# Patient Record
Sex: FEMALE | Race: WHITE
[De-identification: names, ages, dates, MRNs, and addresses within clinical notes are randomized per-mention and may not be internally consistent; named-entity substitution may affect disease eponyms.]

---

## 2017-07-28 ENCOUNTER — HOSPITAL ENCOUNTER (OUTPATIENT)
Dept: HOSPITAL 17 - HSDC | Age: 57
Discharge: HOME | End: 2017-07-28
Attending: INTERNAL MEDICINE
Payer: COMMERCIAL

## 2017-07-28 VITALS
OXYGEN SATURATION: 100 % | HEART RATE: 76 BPM | RESPIRATION RATE: 20 BRPM | DIASTOLIC BLOOD PRESSURE: 62 MMHG | SYSTOLIC BLOOD PRESSURE: 146 MMHG | TEMPERATURE: 98.1 F

## 2017-07-28 VITALS — WEIGHT: 93.48 LBS | BODY MASS INDEX: 19.62 KG/M2 | HEIGHT: 58 IN

## 2017-07-28 VITALS
HEART RATE: 64 BPM | SYSTOLIC BLOOD PRESSURE: 126 MMHG | RESPIRATION RATE: 18 BRPM | TEMPERATURE: 98.3 F | OXYGEN SATURATION: 100 % | DIASTOLIC BLOOD PRESSURE: 66 MMHG

## 2017-07-28 DIAGNOSIS — J44.9: ICD-10-CM

## 2017-07-28 DIAGNOSIS — E78.00: ICD-10-CM

## 2017-07-28 DIAGNOSIS — I10: ICD-10-CM

## 2017-07-28 DIAGNOSIS — G25.81: ICD-10-CM

## 2017-07-28 DIAGNOSIS — J18.9: ICD-10-CM

## 2017-07-28 DIAGNOSIS — R04.2: Primary | ICD-10-CM

## 2017-07-28 DIAGNOSIS — Z87.891: ICD-10-CM

## 2017-07-28 DIAGNOSIS — K21.9: ICD-10-CM

## 2017-07-28 DIAGNOSIS — G47.9: ICD-10-CM

## 2017-07-28 DIAGNOSIS — J40: ICD-10-CM

## 2017-07-28 DIAGNOSIS — J30.9: ICD-10-CM

## 2017-07-28 LAB
APTT BLD: 27.1 SEC (ref 24.3–30.1)
BASOPHILS # BLD AUTO: 0 TH/MM3 (ref 0–0.2)
BASOPHILS NFR BLD: 0.7 % (ref 0–2)
EOSINOPHIL # BLD: 0.2 TH/MM3 (ref 0–0.4)
EOSINOPHIL NFR BLD: 3.1 % (ref 0–4)
ERYTHROCYTE [DISTWIDTH] IN BLOOD BY AUTOMATED COUNT: 13.6 % (ref 11.6–17.2)
HCT VFR BLD CALC: 34.8 % (ref 35–46)
HEMO FLAGS: (no result)
INR PPP: 0.9 RATIO
LYMPHOCYTES # BLD AUTO: 1.9 TH/MM3 (ref 1–4.8)
LYMPHOCYTES NFR BLD AUTO: 28.2 % (ref 9–44)
MCH RBC QN AUTO: 29 PG (ref 27–34)
MCHC RBC AUTO-ENTMCNC: 33.4 % (ref 32–36)
MCV RBC AUTO: 87.1 FL (ref 80–100)
MONOCYTES NFR BLD: 6.1 % (ref 0–8)
NEUTROPHILS # BLD AUTO: 4.2 TH/MM3 (ref 1.8–7.7)
NEUTROPHILS NFR BLD AUTO: 61.9 % (ref 16–70)
PLATELET # BLD: 178 TH/MM3 (ref 150–450)
PROTHROMBIN TIME: 10.3 SEC (ref 9.8–11.6)
RBC # BLD AUTO: 3.99 MIL/MM3 (ref 4–5.3)
WBC # BLD AUTO: 6.8 TH/MM3 (ref 4–11)

## 2017-07-28 PROCEDURE — 00520 ANES CLOSED CHEST PX NOS: CPT

## 2017-07-28 PROCEDURE — 87205 SMEAR GRAM STAIN: CPT

## 2017-07-28 PROCEDURE — 85025 COMPLETE CBC W/AUTO DIFF WBC: CPT

## 2017-07-28 PROCEDURE — 87116 MYCOBACTERIA CULTURE: CPT

## 2017-07-28 PROCEDURE — 31623 DX BRONCHOSCOPE/BRUSH: CPT

## 2017-07-28 PROCEDURE — 87070 CULTURE OTHR SPECIMN AEROBIC: CPT

## 2017-07-28 PROCEDURE — 87102 FUNGUS ISOLATION CULTURE: CPT

## 2017-07-28 PROCEDURE — 87206 SMEAR FLUORESCENT/ACID STAI: CPT

## 2017-07-28 PROCEDURE — 85610 PROTHROMBIN TIME: CPT

## 2017-07-28 PROCEDURE — 85730 THROMBOPLASTIN TIME PARTIAL: CPT

## 2017-07-28 PROCEDURE — 87015 SPECIMEN INFECT AGNT CONCNTJ: CPT

## 2017-08-08 NOTE — MR
cc:

JOSE M SALGUERO M.D.

****

 

 

DATE

8/8/17

 

PROCEDURE

Fiberoptic bronchoscopy flexible

 

REASON FOR BRONCHOSCOPY

Bilateral lung nodules, rule out underlying malignancy, rule out  chronic

inflammatory process.

 

Fiberoptic bronchoscopy performed via LMA. Anesthesia general.

Vocal cords visualized, appeared intact.  Trachea mildly hyperemic.  Bhavani

sharp.  Right main stem bronchus, right upper, middle and lower lobes, left

main bronchus, left upper and lower lobes inspected.  No obstructive pathology

or mass lesion seen.  Diffuse mild to moderate hyperemia throughout noticed.

Washings obtained from both sides of the tracheobronchial tree for routine TB,

fungal cultures as well as cytological exam.  Cytological brush biopsies right

lower lobe obtained for cytological exam. Procedure well tolerated.  The

patient transferred to recovery room in stable condition.

 

IMPRESSION

1.  Mild to moderate tracheobronchitis

2.  No obstruction or mass lesion.

3.  Samples obtained as above

4.  Procedure well tolerated

5.  Patient transferred to recovery in stable condition.

 

 

                              _________________________________

                              Jose M Salguero MD

 

 

 

WWW/

D:  8/8/2017/1:45 PM

T:  8/8/2017/6:51 PM

Visit #:  [id number]

Job #:  [id number]

## 2017-11-10 ENCOUNTER — HOSPITAL ENCOUNTER (EMERGENCY)
Dept: HOSPITAL 17 - NEPE | Age: 57
Discharge: HOME | End: 2017-11-10
Payer: COMMERCIAL

## 2017-11-10 VITALS
HEART RATE: 77 BPM | RESPIRATION RATE: 13 BRPM | DIASTOLIC BLOOD PRESSURE: 93 MMHG | SYSTOLIC BLOOD PRESSURE: 142 MMHG | TEMPERATURE: 98 F | OXYGEN SATURATION: 100 %

## 2017-11-10 VITALS — BODY MASS INDEX: 18.05 KG/M2 | HEIGHT: 58 IN | WEIGHT: 85.98 LBS

## 2017-11-10 VITALS — DIASTOLIC BLOOD PRESSURE: 65 MMHG | SYSTOLIC BLOOD PRESSURE: 141 MMHG

## 2017-11-10 DIAGNOSIS — Z85.3: ICD-10-CM

## 2017-11-10 DIAGNOSIS — Z86.73: ICD-10-CM

## 2017-11-10 DIAGNOSIS — R10.2: Primary | ICD-10-CM

## 2017-11-10 DIAGNOSIS — Z72.0: ICD-10-CM

## 2017-11-10 DIAGNOSIS — Z95.0: ICD-10-CM

## 2017-11-10 DIAGNOSIS — M06.9: ICD-10-CM

## 2017-11-10 DIAGNOSIS — I10: ICD-10-CM

## 2017-11-10 LAB
ALP SERPL-CCNC: 113 U/L (ref 45–117)
ALT SERPL-CCNC: 22 U/L (ref 10–53)
ANION GAP SERPL CALC-SCNC: 5 MEQ/L (ref 5–15)
AST SERPL-CCNC: 17 U/L (ref 15–37)
BASOPHILS # BLD AUTO: 0.1 TH/MM3 (ref 0–0.2)
BASOPHILS NFR BLD: 1 % (ref 0–2)
BILIRUB SERPL-MCNC: 0.4 MG/DL (ref 0.2–1)
BUN SERPL-MCNC: 7 MG/DL (ref 7–18)
CHLORIDE SERPL-SCNC: 103 MEQ/L (ref 98–107)
COLOR UR: YELLOW
COMMENT (UR): (no result)
CULTURE IF INDICATED: (no result)
EOSINOPHIL # BLD: 0.2 TH/MM3 (ref 0–0.4)
EOSINOPHIL NFR BLD: 3.1 % (ref 0–4)
ERYTHROCYTE [DISTWIDTH] IN BLOOD BY AUTOMATED COUNT: 14 % (ref 11.6–17.2)
GFR SERPLBLD BASED ON 1.73 SQ M-ARVRAT: 72 ML/MIN (ref 89–?)
GLUCOSE UR STRIP-MCNC: (no result) MG/DL
HCO3 BLD-SCNC: 31.2 MEQ/L (ref 21–32)
HCT VFR BLD CALC: 41.4 % (ref 35–46)
HEMO FLAGS: (no result)
HGB UR QL STRIP: (no result)
KETONES UR STRIP-MCNC: (no result) MG/DL
LYMPHOCYTES # BLD AUTO: 2.3 TH/MM3 (ref 1–4.8)
LYMPHOCYTES NFR BLD AUTO: 36.7 % (ref 9–44)
MCH RBC QN AUTO: 29.7 PG (ref 27–34)
MCHC RBC AUTO-ENTMCNC: 34.5 % (ref 32–36)
MCV RBC AUTO: 86 FL (ref 80–100)
MONOCYTES NFR BLD: 4.7 % (ref 0–8)
NEUTROPHILS # BLD AUTO: 3.4 TH/MM3 (ref 1.8–7.7)
NEUTROPHILS NFR BLD AUTO: 54.5 % (ref 16–70)
NITRITE UR QL STRIP: (no result)
PLATELET # BLD: 212 TH/MM3 (ref 150–450)
POTASSIUM SERPL-SCNC: 4.4 MEQ/L (ref 3.5–5.1)
RBC # BLD AUTO: 4.81 MIL/MM3 (ref 4–5.3)
SODIUM SERPL-SCNC: 139 MEQ/L (ref 136–145)
SP GR UR STRIP: 1.01 (ref 1–1.03)
WBC # BLD AUTO: 6.2 TH/MM3 (ref 4–11)

## 2017-11-10 PROCEDURE — 96372 THER/PROPH/DIAG INJ SC/IM: CPT

## 2017-11-10 PROCEDURE — 74177 CT ABD & PELVIS W/CONTRAST: CPT

## 2017-11-10 PROCEDURE — 99285 EMERGENCY DEPT VISIT HI MDM: CPT

## 2017-11-10 PROCEDURE — 96361 HYDRATE IV INFUSION ADD-ON: CPT

## 2017-11-10 PROCEDURE — 83690 ASSAY OF LIPASE: CPT

## 2017-11-10 PROCEDURE — 96374 THER/PROPH/DIAG INJ IV PUSH: CPT

## 2017-11-10 PROCEDURE — 81001 URINALYSIS AUTO W/SCOPE: CPT

## 2017-11-10 PROCEDURE — 83605 ASSAY OF LACTIC ACID: CPT

## 2017-11-10 PROCEDURE — 85025 COMPLETE CBC W/AUTO DIFF WBC: CPT

## 2017-11-10 PROCEDURE — 80053 COMPREHEN METABOLIC PANEL: CPT

## 2017-11-10 NOTE — RADRPT
EXAM DATE/TIME:  11/10/2017 10:02 

 

[facility] COMPARISON:     

No previous studies available for comparison.

 

 

INDICATIONS :     

Pelvic pain for a few weeks. 

                      

 

IV CONTRAST:     

80 cc Omnipaque 350 (iohexol) IV 

 

 

ORAL CONTRAST:      

No oral contrast ingested.

                      

 

RADIATION DOSE:     

4.98 CTDIvol (mGy) 

 

 

MEDICAL HISTORY :     

Carcinoma, breast. Hypertension. 

 

SURGICAL HISTORY :      

Cholecystectomy. Mastectomy, right.

 

ENCOUNTER:      

Initial

 

ACUITY:      

3 weeks

 

PAIN SCALE:      

5/10

 

LOCATION:       

Bilateral pelvis 

 

TECHNIQUE:     

Volumetric scanning of the abdomen and pelvis was performed.  Using automated exposure control and ad
justment of the mA and/or kV according to patient size, radiation dose was kept as low as reasonably 
achievable to obtain optimal diagnostic quality images.  DICOM format image data is available electro
nically for review and comparison.  

 

FINDINGS:     

The lung base is are clear.

The liver, spleen, pancreas and adrenals are unremarkable.  Surgical clips are seen at the GE junctio
n.

 

There is symmetrical renal function.  There is no ascites or adenopathy.

Stool is seen throughout the colon

There is no evidence for mass in the pelvis.  The uterine cervical junction is prominent..  Direct vi
sualization is suggested.

Abdominal wall is intact

Review of bone windows reveals only degenerative changes.

 

CONCLUSION:     Negative for pelvic mass.  

 

 

 Romario Tompkins MD FACR on November 10, 2017 at 10:26           

Board Certified Radiologist.

 This report was verified electronically.

## 2017-11-10 NOTE — PD
HPI


Chief Complaint:  Abdominal Pain


Time Seen by Provider:  08:32


Travel History


International Travel<30 days:  No


Contact w/Intl Traveler<30days:  No


Traveled to known affect area:  No





History of Present Illness


HPI


57-year-old female arrives due to pelvic pain.  She reports decreased 

urination.  Duration is only several weeks.  She was evidently diagnosed with a 

potential mass based on CT imaging about 8 months ago.  Subsequent 

sigmoidoscopy revealed no mass however colonoscopy was recommended however none 

was performed.  She follows with family medicine resident service and has been 

taking Percocet 7.5 mg tablets as needed for pain.  She's had no fever.  Nausea 

is reported however no vomiting.





PFSH


Past Medical History


Arthritis:  Yes (RA)


Heart Rhythm Problems:  No


Cancer:  Yes (BREAST)


Cardiac Catheterization:  Yes


Cardiovascular Problems:  Yes (PACEMAKER)


High Cholesterol:  No


Chest Pain:  Yes


Congestive Heart Failure:  No


Cerebrovascular Accident:  Yes (TIA)


Diabetes:  No


Diminished Hearing:  No


Hypertension:  Yes


Implanted Vascular Access Dvce:  Yes ([employer], MODEL [id number])





Past Surgical History


Cardiac Surgery:  Yes (PACEMAKER)


Cholecystectomy:  Yes


Coronary Artery Bypass Graft:  No


Mastectomy:  Yes (RIGHT)





Family History


Family Myocardial Infarction:  Yes (father, brother)





Social History


Alcohol Use:  No


Tobacco Use:  Yes


Substance Use:  No





Allergies-Medications


(Allergen,Severity, Reaction):  


Coded Allergies:  


     pregabalin (Unverified  Allergy, Severe, Rash, 11/10/17)


     tramadol (Unverified  Allergy, Severe, Rash, 11/10/17)


Reported Meds & Prescriptions





Reported Meds & Active Scripts


Active


Reported


Oxycodone-Acetaminophen 7.5-325 mg Tab 1 Tab PO Q6H PRN


Proair Hfa 8.5 GM Inh (Albuterol Sulfate) 90 Mcg/Act Aer 2 Puff INH Q4HR PRN


     108 mcg/actuation


Pramipexole (Pramipexole Dihydrochloride) 0.125 Mg Tab 0.125 Mg PO DAILY


Omeprazole 40 Mg Cap 40 Mg PO DAILY


Montelukast (Montelukast Sodium) 10 Mg Tab 10 Mg PO HS


Lisinopril 20 Mg Tab 20 Mg PO DAILY


Flexeril (Cyclobenzaprine HCl) 5 Mg Tab 5 Mg PO TID


Amlodipine (Amlodipine Besylate) 5 Mg Tab 5 Mg PO BID


Aspirin 81 Low Dose (Aspirin) 81 Mg Chew 81 Mg CHEW DAILY


Ventolin Hfa 18 GM Inh (Albuterol Sulfate) 90 Mcg/Act Aer 2 Puff INH Q4H PRN


Symbicort Inh (Budesonide/Formoterol Fumarate) 160-4.5 Mcg/Act Aero 2 Puff INH 

Q12HR


Spiriva Handihaler (Tiotropium Inh) 18 Mcg Cap 18 Mcg INH DAILY


     1 capsule = 18 mcg


Hydroxyzine HCl 25 Mg Tab 25 Mg PO TID


Trazodone (Trazodone HCl) 50 Mg Tab 50 Mg PO HS


Nortriptyline (Nortriptyline HCl) 25 Mg Cap 25 Mg PO HS


Gabapentin 300 Mg Cap 600 Mg PO TID








Review of Systems


Except as stated in HPI:  all other systems reviewed are Neg


General / Constitutional:  No: Fever


Genitourinary:  Positive: Pelvic Pain





Physical Exam


Narrative


GENERAL: 57-year-old female pleasant well-nourished well-developed mild 

distress secondary to pain


SKIN: Focused skin assessment warm/dry.


HEAD: Atraumatic. Normocephalic. 


EYES: Pupils equal and round. No scleral icterus. No injection or drainage. 


ENT: No nasal bleeding or discharge.  Mucous membranes pink and moist.


NECK: Trachea midline. No JVD. 


CARDIOVASCULAR: Regular rate and rhythm.  No murmur appreciated.


RESPIRATORY: No accessory muscle use. Clear to auscultation. Breath sounds 

equal bilaterally. 


GASTROINTESTINAL: Soft.  Tenderness palpation supra pubic abdomen.


MUSCULOSKELETAL: No obvious deformities. No clubbing.  No cyanosis.  No edema. 


NEUROLOGICAL: Awake and alert. No obvious cranial nerve deficits.  Motor 

grossly within normal limits. Normal speech.


PSYCHIATRIC: Appropriate mood and affect; insight and judgment normal.





Data


Data


Last Documented VS





Vital Signs








  Date Time  Temp Pulse Resp B/P (MAP) Pulse Ox O2 Delivery O2 Flow Rate FiO2


 


11/10/17 11:13  73 15 141/65 (90) 97   


 


11/10/17 08:14 98.0       








Orders





 Orders


Complete Blood Count With Diff (11/10/17 08:36)


Comprehensive Metabolic Panel (11/10/17 08:36)


Lipase (11/10/17 08:36)


Lactic Acid (11/10/17 08:36)


Urinalysis - C+S If Indicated (11/10/17 08:36)


Ct Abd/Pel W Iv Contrast(Rout) (11/10/17 08:36)


Iv Access Insert/Monitor (11/10/17 08:36)


Ecg Monitoring (11/10/17 08:36)


Oximetry (11/10/17 08:36)


Sodium Chlor 0.9% 1000 Ml Inj (Ns 1000 M (11/10/17 08:36)


Sodium Chloride 0.9% Flush (Ns Flush) (11/10/17 08:45)


Oxycodone-Acetamin 5-325 Mg (Percocet (11/10/17 08:45)


Iohexol 350 Inj (Omnipaque 350 Inj) (11/10/17 10:05)


Dicyclomine Inj (Bentyl Inj) (11/10/17 10:30)


Ketorolac Inj (Toradol Inj) (11/10/17 10:30)


Al-Mag Hy-Si 40-40-4 Mg/Ml Liq (Mag-Al P (11/10/17 10:30)


Lidocaine 2% Viscous (Xylocaine 2% Visco (11/10/17 10:30)


Ed Discharge Order (11/10/17 10:44)





Labs





Laboratory Tests








Test


  11/10/17


08:44 11/10/17


08:47


 


White Blood Count 6.2 TH/MM3  


 


Red Blood Count 4.81 MIL/MM3  


 


Hemoglobin 14.3 GM/DL  


 


Hematocrit 41.4 %  


 


Mean Corpuscular Volume 86.0 FL  


 


Mean Corpuscular Hemoglobin 29.7 PG  


 


Mean Corpuscular Hemoglobin


Concent 34.5 % 


  


 


 


Red Cell Distribution Width 14.0 %  


 


Platelet Count 212 TH/MM3  


 


Mean Platelet Volume 9.9 FL  


 


Neutrophils (%) (Auto) 54.5 %  


 


Lymphocytes (%) (Auto) 36.7 %  


 


Monocytes (%) (Auto) 4.7 %  


 


Eosinophils (%) (Auto) 3.1 %  


 


Basophils (%) (Auto) 1.0 %  


 


Neutrophils # (Auto) 3.4 TH/MM3  


 


Lymphocytes # (Auto) 2.3 TH/MM3  


 


Monocytes # (Auto) 0.3 TH/MM3  


 


Eosinophils # (Auto) 0.2 TH/MM3  


 


Basophils # (Auto) 0.1 TH/MM3  


 


CBC Comment DIFF FINAL  


 


Differential Comment   


 


Urine Color YELLOW  


 


Urine Turbidity CLEAR  


 


Urine pH 7.0  


 


Urine Specific Gravity 1.015  


 


Urine Protein NEG mg/dL  


 


Urine Glucose (UA) NEG mg/dL  


 


Urine Ketones NEG mg/dL  


 


Urine Occult Blood NEG  


 


Urine Nitrite NEG  


 


Urine Bilirubin NEG  


 


Urine Urobilinogen 2.0 MG/DL  


 


Urine Leukocyte Esterase NEG  


 


Urine RBC


  LESS THAN 1


/hpf 


 


 


Urine WBC


  LESS THAN 1


/hpf 


 


 


Microscopic Urinalysis Comment


  CULT NOT


INDICATED 


 


 


Blood Urea Nitrogen 7 MG/DL  


 


Creatinine 0.81 MG/DL  


 


Random Glucose 134 MG/DL  


 


Total Protein 7.9 GM/DL  


 


Albumin 3.8 GM/DL  


 


Calcium Level 9.1 MG/DL  


 


Alkaline Phosphatase 113 U/L  


 


Aspartate Amino Transf


(AST/SGOT) 17 U/L 


  


 


 


Alanine Aminotransferase


(ALT/SGPT) 22 U/L 


  


 


 


Total Bilirubin 0.4 MG/DL  


 


Sodium Level 139 MEQ/L  


 


Potassium Level 4.4 MEQ/L  


 


Chloride Level 103 MEQ/L  


 


Carbon Dioxide Level 31.2 MEQ/L  


 


Anion Gap 5 MEQ/L  


 


Estimat Glomerular Filtration


Rate 72 ML/MIN 


  


 


 


Lipase 58 U/L  


 


Lactic Acid Level  1.2 mmol/L 











MDM


Medical Decision Making


Medical Screen Exam Complete:  Yes


Emergency Medical Condition:  Yes


Medical Record Reviewed:  Yes


Differential Diagnosis


Gastritis, pancreatitis, appendicitis, acute cholecystitis, ascending 

cholangitis, AAA, perforated viscous, mesenteric ischemia, hepatitis, cystitis, 

hydronephrosis/hydroureter/nephroureter calculus, mesenteric adenitis, biliary 

colic


Narrative Course


CBC & BMP Diagram


11/10/17 08:44








Total Protein 7.9, Albumin 3.8, Calcium Level 9.1, Alkaline Phosphatase 113, 

Aspartate Amino Transf (AST/SGOT) 17, Alanine Aminotransferase (ALT/SGPT) 22, 

Total Bilirubin 0.4





Lactic acid 1.2


Lipase 58


Urinalysis no UTI





Last Impressions








Abdomen/Pelvis CT 11/10/17 0836 Signed





Impressions: 





 Service Date/Time:  Friday, November 10, 2017 10:02 - CONCLUSION: Negative for 





 pelvic mass.      Romario Tompkins MD  FACR





Etiology unclear for pain.


RN reports patient requested IV pain medication raising possibility of opioid 

tolerance. 


We'll try alternative pain management.


GI follow up. 


Pt agreeable w plan.





Diagnosis





 Primary Impression:  


 Pelvic pain in female


Referrals:  


Primary Care Physician


2 days





***Additional Instructions:  


You have a choice when it comes to health care, and we are glad that you chose 

[facility]. Hopefully, we have met your expectations on today's visit.  You 

are welcome to return to [facility] at any time, as we are committed to 

meeting the health care needs of our community.


***Med/Other Pt SpecificInfo:  Prescription(s) given


Scripts


Promethazine (Phenergan) 25 Mg Tablet


25 MG PO Q6H Y for NAUSEA OR VOMITING, #20 TAB 0 Refills


   Prov: Franki Vora MD         11/10/17 


Dicyclomine (Bentyl) 10 Mg Cap


10 MG PO TID for Bowel Management, #30 CAP 0 Refills


   Prov: Franki Vora MD         11/10/17


Disposition:  01 DISCHARGE HOME


Condition:  Stable











Franki Vora MD Nov 10, 2017 10:00

## 2017-12-06 ENCOUNTER — HOSPITAL ENCOUNTER (EMERGENCY)
Dept: HOSPITAL 17 - NEPD | Age: 57
Discharge: HOME | End: 2017-12-06
Payer: COMMERCIAL

## 2017-12-06 VITALS
HEART RATE: 90 BPM | OXYGEN SATURATION: 99 % | SYSTOLIC BLOOD PRESSURE: 140 MMHG | RESPIRATION RATE: 20 BRPM | TEMPERATURE: 98.6 F | DIASTOLIC BLOOD PRESSURE: 63 MMHG

## 2017-12-06 VITALS — OXYGEN SATURATION: 99 %

## 2017-12-06 DIAGNOSIS — R10.2: Primary | ICD-10-CM

## 2017-12-06 DIAGNOSIS — I10: ICD-10-CM

## 2017-12-06 DIAGNOSIS — Z72.0: ICD-10-CM

## 2017-12-06 LAB
ANION GAP SERPL CALC-SCNC: 4 MEQ/L (ref 5–15)
BASOPHILS # BLD AUTO: 0.1 TH/MM3 (ref 0–0.2)
BASOPHILS NFR BLD: 0.7 % (ref 0–2)
BUN SERPL-MCNC: 12 MG/DL (ref 7–18)
CHLORIDE SERPL-SCNC: 103 MEQ/L (ref 98–107)
COLOR UR: YELLOW
COMMENT (UR): (no result)
CULTURE IF INDICATED: (no result)
EOSINOPHIL # BLD: 0.2 TH/MM3 (ref 0–0.4)
EOSINOPHIL NFR BLD: 1.7 % (ref 0–4)
ERYTHROCYTE [DISTWIDTH] IN BLOOD BY AUTOMATED COUNT: 13.3 % (ref 11.6–17.2)
GFR SERPLBLD BASED ON 1.73 SQ M-ARVRAT: 79 ML/MIN (ref 89–?)
GLUCOSE UR STRIP-MCNC: (no result) MG/DL
HCO3 BLD-SCNC: 29 MEQ/L (ref 21–32)
HCT VFR BLD CALC: 41.5 % (ref 35–46)
HEMO FLAGS: (no result)
HGB UR QL STRIP: (no result)
KETONES UR STRIP-MCNC: (no result) MG/DL
LYMPHOCYTES # BLD AUTO: 2.5 TH/MM3 (ref 1–4.8)
LYMPHOCYTES NFR BLD AUTO: 18.6 % (ref 9–44)
MCH RBC QN AUTO: 29 PG (ref 27–34)
MCHC RBC AUTO-ENTMCNC: 33.9 % (ref 32–36)
MCV RBC AUTO: 85.6 FL (ref 80–100)
MONOCYTES NFR BLD: 4.8 % (ref 0–8)
NEUTROPHILS # BLD AUTO: 10.2 TH/MM3 (ref 1.8–7.7)
NEUTROPHILS NFR BLD AUTO: 74.2 % (ref 16–70)
NITRITE UR QL STRIP: (no result)
PLATELET # BLD: 257 TH/MM3 (ref 150–450)
POTASSIUM SERPL-SCNC: 4.7 MEQ/L (ref 3.5–5.1)
RBC # BLD AUTO: 4.85 MIL/MM3 (ref 4–5.3)
SODIUM SERPL-SCNC: 136 MEQ/L (ref 136–145)
SP GR UR STRIP: 1.02 (ref 1–1.03)
SQUAMOUS #/AREA URNS HPF: 1 /HPF (ref 0–5)
WBC # BLD AUTO: 13.7 TH/MM3 (ref 4–11)

## 2017-12-06 PROCEDURE — 74177 CT ABD & PELVIS W/CONTRAST: CPT

## 2017-12-06 PROCEDURE — 80048 BASIC METABOLIC PNL TOTAL CA: CPT

## 2017-12-06 PROCEDURE — 96372 THER/PROPH/DIAG INJ SC/IM: CPT

## 2017-12-06 PROCEDURE — 81001 URINALYSIS AUTO W/SCOPE: CPT

## 2017-12-06 PROCEDURE — 85025 COMPLETE CBC W/AUTO DIFF WBC: CPT

## 2017-12-06 PROCEDURE — 99285 EMERGENCY DEPT VISIT HI MDM: CPT

## 2017-12-06 NOTE — PD
HPI


Chief Complaint:  Abdominal Pain


Time Seen by Provider:  17:25


Travel History


International Travel<30 days:  No


Contact w/Intl Traveler<30days:  No


Traveled to known affect area:  No





History of Present Illness


HPI


57-year-old female complains of low abdominal pain and pelvic pain.  Patient 

has history recurrent low abdominal pelvic pain.  Patient states the pain is 

cramping pain and sharp pain localized to lower abdomen pelvic area.  Patient 

denies any pain radiation.  On a scale of 1-10 the pain is a 10.  Patient has 

been seen in emergency room and by personal physician for that.  Patient was 

referred to GI specialist.  Patient is awaiting colonoscopy 7 days from now.  

Patient states that the pain is worse today.  Patient states that she has 

persistent nausea but no vomiting no diarrhea.  Patient denies any dysuria or 

frequency.  Patient states that he has low-grade fever at home.  Patient has 

history of asthma, hypertension, osteoporosis, chronic pain, Brugada syndrome 

status post pacemaker placement.  Patient status post hysterectomy, status post 

colonoscopy with polyps removal, right breast lumpectomy.  Patient's on chronic 

pain medication at home.





PFSH


Past Medical History


Arthritis:  Yes (RA)


Asthma:  Yes


Heart Rhythm Problems:  No


Cancer:  Yes (BREAST)


Cardiac Catheterization:  Yes


Cardiovascular Problems:  Yes


High Cholesterol:  No


Chest Pain:  Yes


Congestive Heart Failure:  No


COPD:  Yes


Cerebrovascular Accident:  Yes (TIA)


Diabetes:  No


Diminished Hearing:  No


Endocrine:  No


Gastrointestinal Disorders:  Yes (ACID REFLUX)


Genitourinary:  No


Hepatitis:  No


Hiatal Hernia:  No


Heparin Induced Thrombocytopen:  No


Hypertension:  Yes


Immune Disorder:  No


Implanted Vascular Access Dvce:  Yes ([employer], MODEL [id number])


Musculoskeletal:  Yes (arthritis entire body  degenerative disease)


Neurologic:  No


Psychiatric:  No


Reproductive:  Yes (hx of hysterectomy)


Respiratory:  Yes


Immunizations Current:  No


Thyroid Disease:  No


Tetanus Vaccination:  < 5 Years


Menopausal:  Yes


:  4


Para:  4


Miscarriage:  0


:  0





Past Surgical History


Abdominal Surgery:  Yes (HERNIA REPAIR, CHOLECYSTECTOMY)


AICD:  Yes


Cardiac Surgery:  Yes (PACEMAKER)


Cholecystectomy:  Yes


Coronary Artery Bypass Graft:  No


Endocrine Surgery:  No


Genitourinary Surgery:  No


Gynecologic Surgery:  Yes (fibroids in breast, HYSERTECTOMY)


Hysterectomy:  Yes


Joint Replacement:  No


Mastectomy:  Yes (RIGHT)


Pacemaker:  Yes ([employer])


Other Surgery:  Yes (abd hernia, right knee surgery)





Family History


Family Myocardial Infarction:  Yes (father, brother)





Social History


Alcohol Use:  No


Tobacco Use:  Yes


Substance Use:  No





Allergies-Medications


(Allergen,Severity, Reaction):  


Coded Allergies:  


     tramadol (Unverified  Allergy, Severe, Palpitations, 17)


     duloxetine (Unverified  Allergy, Intermediate, Rash, 17)


 Rash all over


     ibuprofen (Unverified  Allergy, Intermediate, Nausea/Vomiting, 17)


     ketorolac (Unverified  Allergy, Intermediate, Rash, 17)


 Rash all over body


     naproxen (Unverified  Allergy, Intermediate, Rash, 17)


 Rash all over body


     pregabalin (Unverified  Allergy, Intermediate, Rash, 17)


 Rash all over body


     promethazine (Unverified  Allergy, Intermediate, Rash, 17)


 Rash all over body


     tizanidine (Unverified  Allergy, Intermediate, Elevated blood sugar , )


Reported Meds & Prescriptions





Reported Meds & Active Scripts


Active


Bentyl (Dicyclomine HCl) 10 Mg Cap 10 Mg PO TID PRN


Omeprazole 40 Mg Cap 40 Mg PO DAILY


Alendronate (Alendronate Sodium) 70 Mg Tab 70 Mg PO Q7D


Symbicort Inh (Budesonide/Formoterol Fumarate) 160-4.5 Mcg/Act Aero 2 Puff INH 

ONCE


Proair Hfa 8.5 GM Inh (Albuterol Sulfate) 90 Mcg/Act Aer 2 Puff INH Q4HR PRN


     108 mcg/actuation


Gabapentin 600 Mg Tab 600 Mg PO TID


Phenergan (Promethazine HCl) 25 Mg Tablet 25 Mg PO Q6H PRN


Bentyl (Dicyclomine HCl) 10 Mg Cap 10 Mg PO TID


Pramipexole (Pramipexole Dihydrochloride) 0.125 Mg Tab 0.125 Mg PO DAILY


Percocet (Oxycodone-Acetaminophen) 7.5-325 mg Tab 1 Tab PO Q6H PRN


     Do not fill until 2017


Percocet (Oxycodone-Acetaminophen) 7.5-325 mg Tab 1 Tab PO Q6H PRN


     Do not fill until 2017


Percocet (Oxycodone-Acetaminophen) 7.5-325 mg Tab 1 Tab PO Q6HR PRN


Lisinopril 10 Mg Tab 20 Mg PO DAILY


Spiriva Handihaler (Tiotropium Inh) 18 Mcg Cap 18 Mcg INH DAILY


     1 capsule = 18 mcg


Montelukast (Montelukast Sodium) 10 Mg Tab 10 Mg PO HS


Trazodone (Trazodone HCl) 50 Mg Tab 50 Mg PO HS


Miralax Powder (Polyethylene Glycol 3350 Powder) 17 Gm Powd 17 Gm PO DAILY


     Mix and dissolve one measuring cap-ful (17 grams) in water or juice.


Amlodipine (Amlodipine Besylate) 5 Mg Tab 5 Mg PO BID


Albuterol Neb (Albuterol Sulfate) 2.5 Mg/3 Ml Neb 2.5 Mg NEB Q4HR NEB PRN


Flexeril (Cyclobenzaprine HCl) 5 Mg Tab 5 Mg PO TID


Hydroxyzine HCl 25 Mg Tab 25 Mg PO TID PRN


Reported


Oxycodone-Acetaminophen 7.5-325 mg Tab 1 Tab PO Q6H PRN


Proair Hfa 8.5 GM Inh (Albuterol Sulfate) 90 Mcg/Act Aer 2 Puff INH Q4HR PRN


     108 mcg/actuation


Pramipexole (Pramipexole Dihydrochloride) 0.125 Mg Tab 0.125 Mg PO DAILY


Omeprazole 40 Mg Cap 40 Mg PO DAILY


Montelukast (Montelukast Sodium) 10 Mg Tab 10 Mg PO HS


Lisinopril 20 Mg Tab 20 Mg PO DAILY


Flexeril (Cyclobenzaprine HCl) 5 Mg Tab 5 Mg PO TID


Amlodipine (Amlodipine Besylate) 5 Mg Tab 5 Mg PO BID


Aspirin 81 Low Dose (Aspirin) 81 Mg Chew 81 Mg CHEW DAILY


Ventolin Hfa 18 GM Inh (Albuterol Sulfate) 90 Mcg/Act Aer 2 Puff INH Q4H PRN


Symbicort Inh (Budesonide/Formoterol Fumarate) 160-4.5 Mcg/Act Aero 2 Puff INH 

Q12HR


Spiriva Handihaler (Tiotropium Inh) 18 Mcg Cap 18 Mcg INH DAILY


     1 capsule = 18 mcg


Hydroxyzine HCl 25 Mg Tab 25 Mg PO TID


Trazodone (Trazodone HCl) 50 Mg Tab 50 Mg PO HS


Nortriptyline (Nortriptyline HCl) 25 Mg Cap 25 Mg PO HS


Gabapentin 300 Mg Cap 600 Mg PO TID








Review of Systems


General / Constitutional:  No: Fever


Eyes:  No: Visual changes


HENT:  No: Headaches


Cardiovascular:  No: Chest Pain or Discomfort


Respiratory:  No: Shortness of Breath


Gastrointestinal:  Positive: Nausea, Abdominal Pain


Genitourinary:  No: Dysuria


Musculoskeletal:  No: Pain


Skin:  No Rash


Neurologic:  No: Weakness


Psychiatric:  No: Depression


Endocrine:  No: Polydipsia


Hematologic/Lymphatic:  No: Easy Bruising





Physical Exam


Narrative


GENERAL: Well-nourished, well-developed patient.


SKIN: Focused skin assessment warm/dry.


HEAD: Normocephalic.


EYES: No scleral icterus. No injection or drainage. 


NECK: Supple, trachea midline. No JVD or lymphadenopathy.


CARDIOVASCULAR: Regular rate and rhythm without murmurs, gallops, or rubs. 


RESPIRATORY: Breath sounds equal bilaterally. No accessory muscle use.


GASTROINTESTINAL: Abdomen soft,  nondistended.  Patient has moderate tenderness 

on palpation lower abdomen.  No rebound tenderness.  No mass.


MUSCULOSKELETAL: No cyanosis, or edema. 


BACK: Nontender without obvious deformity. No CVA tenderness. 


Neurologic exam normal.





Data


Data


Last Documented VS





Vital Signs








  Date Time  Temp Pulse Resp B/P (MAP) Pulse Ox O2 Delivery O2 Flow Rate FiO2


 


17 17:59     99   


 


17 14:51 98.6 90 20     








Orders





 Orders


Urinalysis - C+S If Indicated (17 15:24)


Complete Blood Count With Diff (17 15:25)


Basic Metabolic Panel (Bmp) (17 15:25)


Ct Abd/Pel W Iv Contrast(Rout) (17 17:43)


Iv Access Insert/Monitor (17 17:43)


Ecg Monitoring (17 17:43)


Oximetry (17 17:43)


Sodium Chloride 0.9% Flush (Ns Flush) (17 17:45)


Dicyclomine Inj (Bentyl Inj) (17 17:45)


Iohexol 350 Inj (Omnipaque 350 Inj) (17 14:49)


Ed Discharge Order (17 19:29)





Labs





Laboratory Tests








Test


  17


15:35 17


16:07


 


Urine Color YELLOW  


 


Urine Turbidity HAZY  


 


Urine pH 7.5  


 


Urine Specific Gravity 1.025  


 


Urine Protein TRACE mg/dL  


 


Urine Glucose (UA) NEG mg/dL  


 


Urine Ketones NEG mg/dL  


 


Urine Occult Blood NEG  


 


Urine Nitrite NEG  


 


Urine Bilirubin NEG  


 


Urine Urobilinogen


  LESS THAN 2.0


MG/DL 


 


 


Urine Leukocyte Esterase NEG  


 


Urine WBC


  LESS THAN 1


/hpf 


 


 


Urine Squamous Epithelial


Cells 1 /hpf 


  


 


 


Microscopic Urinalysis Comment


  CULT NOT


INDICATED 


 


 


White Blood Count  13.7 TH/MM3 


 


Red Blood Count  4.85 MIL/MM3 


 


Hemoglobin  14.1 GM/DL 


 


Hematocrit  41.5 % 


 


Mean Corpuscular Volume  85.6 FL 


 


Mean Corpuscular Hemoglobin  29.0 PG 


 


Mean Corpuscular Hemoglobin


Concent 


  33.9 % 


 


 


Red Cell Distribution Width  13.3 % 


 


Platelet Count  257 TH/MM3 


 


Mean Platelet Volume  9.5 FL 


 


Neutrophils (%) (Auto)  74.2 % 


 


Lymphocytes (%) (Auto)  18.6 % 


 


Monocytes (%) (Auto)  4.8 % 


 


Eosinophils (%) (Auto)  1.7 % 


 


Basophils (%) (Auto)  0.7 % 


 


Neutrophils # (Auto)  10.2 TH/MM3 


 


Lymphocytes # (Auto)  2.5 TH/MM3 


 


Monocytes # (Auto)  0.7 TH/MM3 


 


Eosinophils # (Auto)  0.2 TH/MM3 


 


Basophils # (Auto)  0.1 TH/MM3 


 


CBC Comment  DIFF FINAL 


 


Differential Comment   


 


Blood Urea Nitrogen  12 MG/DL 


 


Creatinine  0.75 MG/DL 


 


Random Glucose  89 MG/DL 


 


Calcium Level  9.6 MG/DL 


 


Sodium Level  136 MEQ/L 


 


Potassium Level  4.7 MEQ/L 


 


Chloride Level  103 MEQ/L 


 


Carbon Dioxide Level  29.0 MEQ/L 


 


Anion Gap  4 MEQ/L 


 


Estimat Glomerular Filtration


Rate 


  79 ML/MIN 


 











[facility]


Medical Decision Making


Medical Screen Exam Complete:  Yes


Emergency Medical Condition:  Yes


Medical Record Reviewed:  Yes


Interpretation(s)


1924 PM.  CT abdomen and pelvis shows prominence of intrahepatic electrocardiac 

biliary system status post cholecystectomy.


Differential Diagnosis


Differential diagnosis including colitis, UTI, pyelonephritis, and 

nephrolithiasis, appendicitis


Narrative Course


57-year-old female with recurrent low abdominal pain.





Diagnosis





 Primary Impression:  


 Abdominal pain


 Qualified Codes:  R10.30 - Lower abdominal pain, unspecified


Patient Instructions:  General Instructions





***Additional Instructions:  


Bentyl as needed.  Continue all medication.  Follow-up with GI specialist and 

personal physician.  Return if worse.


***Med/Other Pt SpecificInfo:  Prescription(s) given


Scripts


Dicyclomine (Bentyl) 10 Mg Cap


10 MG PO TID Y for Bowel Management, #30 CAP 0 Refills


   Prov: Nasir Adam MD         17


Disposition:  01 DISCHARGE HOME


Condition:  Stable











Nasir Adam MD Dec 6, 2017 17:51

## 2017-12-06 NOTE — RADRPT
EXAM DATE/TIME:  12/06/2017 18:49 

 

[facility] COMPARISON:     

CT ABDOMEN & PELVIS W CONTRAST, November 10, 2017, 10:02.

 

 

INDICATIONS :     

Abdomen and pelvic pain past month.

                      

 

IV CONTRAST:     

75 cc Omnipaque 350 (iohexol) IV 

 

 

ORAL CONTRAST:      

No oral contrast ingested.

                      

 

RADIATION DOSE:     

6.64 CTDIvol (mGy) 

 

 

MEDICAL HISTORY :     

Cardiovascular disease. Hypertension. Chronic obstructive pulmonary disease.Breast cancer

 

SURGICAL HISTORY :      

Mastectomy, bilateral. Cholecystectomy.Hysterectomy.

 

ENCOUNTER:      

Initial

 

ACUITY:      

1 month

 

PAIN SCALE:      

5/10

 

LOCATION:       

Bilateral  abdomen and pelvis

 

TECHNIQUE:     

Volumetric scanning of the abdomen and pelvis was performed.  Using automated exposure control and ad
justment of the mA and/or kV according to patient size, radiation dose was kept as low as reasonably 
achievable to obtain optimal diagnostic quality images.  DICOM format image data is available electro
nically for review and comparison.  

 

FINDINGS:     

 

LOWER LUNGS:     

The visualized lower lungs are clear.

 

LIVER:     

Homogeneous density without lesion. There is mild prominence of the central intrahepatic and extrahep
atic biliary system with the common bile duct measuring 9 mm. No calcified distal common bile duct ca
lculus is noted. Correlation with alkaline phosphatase and bilirubin levels is suggested to rule out 
biliary obstruction. [place] phenomenon status post cholecystectomy is also in the differential.

 

SPLEEN:     

Normal size without lesion.

 

PANCREAS:     

Within normal limits.

 

KIDNEYS:     

Normal in size and shape.  There is no mass, stone or hydronephrosis.

 

ADRENAL GLANDS:     

Within normal limits.

 

VASCULAR:     

There is no aortic aneurysm.

 

BOWEL/MESENTERY:     

The stomach, small bowel, and colon demonstrate no acute abnormality.  There is no free intraperitone
al air or fluid.

 

ABDOMINAL WALL:     

Within normal limits.

 

RETROPERITONEUM:     

There is no lymphadenopathy. 

 

BLADDER:     

No wall thickening or mass. 

 

REPRODUCTIVE:     

Within normal limits.

 

INGUINAL:     

There is no lymphadenopathy or hernia. 

 

MUSCULOSKELETAL:     

Within normal limits for patient age. 

 

CONCLUSION:     Mild prominence of the central intrahepatic and extrahepatic biliary system with the 
common bile duct measuring 9 mm. No calcified distal common bile duct calculus is noted. Correlation 
with alkaline phosphatase and bilirubin levels is suggested to rule out biliary obstruction. Reservoi
r phenomenon status post cholecystectomy is also in the differential.

 

 

 Navid Rodriguez MD on December 06, 2017 at 19:12           

Board Certified Radiologist.

 This report was verified electronically.

## 2018-04-16 ENCOUNTER — HOSPITAL ENCOUNTER (EMERGENCY)
Dept: HOSPITAL 17 - NEPC | Age: 58
Discharge: HOME | End: 2018-04-16
Payer: COMMERCIAL

## 2018-04-16 VITALS
SYSTOLIC BLOOD PRESSURE: 156 MMHG | DIASTOLIC BLOOD PRESSURE: 72 MMHG | RESPIRATION RATE: 18 BRPM | OXYGEN SATURATION: 100 % | HEART RATE: 75 BPM

## 2018-04-16 VITALS
RESPIRATION RATE: 18 BRPM | HEART RATE: 75 BPM | SYSTOLIC BLOOD PRESSURE: 153 MMHG | OXYGEN SATURATION: 98 % | DIASTOLIC BLOOD PRESSURE: 72 MMHG

## 2018-04-16 VITALS — WEIGHT: 92.59 LBS | HEIGHT: 58 IN | BODY MASS INDEX: 19.44 KG/M2

## 2018-04-16 VITALS
RESPIRATION RATE: 22 BRPM | DIASTOLIC BLOOD PRESSURE: 72 MMHG | OXYGEN SATURATION: 100 % | TEMPERATURE: 98.7 F | HEART RATE: 70 BPM | SYSTOLIC BLOOD PRESSURE: 157 MMHG

## 2018-04-16 DIAGNOSIS — Z95.810: ICD-10-CM

## 2018-04-16 DIAGNOSIS — Z88.6: ICD-10-CM

## 2018-04-16 DIAGNOSIS — G89.29: ICD-10-CM

## 2018-04-16 DIAGNOSIS — R10.9: Primary | ICD-10-CM

## 2018-04-16 DIAGNOSIS — I10: ICD-10-CM

## 2018-04-16 LAB
ALBUMIN SERPL-MCNC: 3.8 GM/DL (ref 3.4–5)
ALP SERPL-CCNC: 100 U/L (ref 45–117)
ALT SERPL-CCNC: 20 U/L (ref 10–53)
AST SERPL-CCNC: 29 U/L (ref 15–37)
BASOPHILS # BLD AUTO: 0.1 TH/MM3 (ref 0–0.2)
BASOPHILS NFR BLD: 1.2 % (ref 0–2)
BILIRUB SERPL-MCNC: 0.6 MG/DL (ref 0.2–1)
BUN SERPL-MCNC: 10 MG/DL (ref 7–18)
CALCIUM SERPL-MCNC: 9.7 MG/DL (ref 8.5–10.1)
CHLORIDE SERPL-SCNC: 105 MEQ/L (ref 98–107)
COLOR UR: YELLOW
CREAT SERPL-MCNC: 0.83 MG/DL (ref 0.5–1)
EOSINOPHIL # BLD: 0.1 TH/MM3 (ref 0–0.4)
EOSINOPHIL NFR BLD: 1.6 % (ref 0–4)
ERYTHROCYTE [DISTWIDTH] IN BLOOD BY AUTOMATED COUNT: 13.9 % (ref 11.6–17.2)
GFR SERPLBLD BASED ON 1.73 SQ M-ARVRAT: 70 ML/MIN (ref 89–?)
GLUCOSE SERPL-MCNC: 92 MG/DL (ref 74–106)
GLUCOSE UR STRIP-MCNC: (no result) MG/DL
HCO3 BLD-SCNC: 28.8 MEQ/L (ref 21–32)
HCT VFR BLD CALC: 40.4 % (ref 35–46)
HGB BLD-MCNC: 13.5 GM/DL (ref 11.6–15.3)
HGB UR QL STRIP: (no result)
KETONES UR STRIP-MCNC: (no result) MG/DL
LYMPHOCYTES # BLD AUTO: 2.3 TH/MM3 (ref 1–4.8)
LYMPHOCYTES NFR BLD AUTO: 35.1 % (ref 9–44)
MCH RBC QN AUTO: 27.9 PG (ref 27–34)
MCHC RBC AUTO-ENTMCNC: 33.5 % (ref 32–36)
MCV RBC AUTO: 83.1 FL (ref 80–100)
MONOCYTE #: 0.4 TH/MM3 (ref 0–0.9)
MONOCYTES NFR BLD: 5.5 % (ref 0–8)
NEUTROPHILS # BLD AUTO: 3.7 TH/MM3 (ref 1.8–7.7)
NEUTROPHILS NFR BLD AUTO: 56.6 % (ref 16–70)
NITRITE UR QL STRIP: (no result)
PLATELET # BLD: 339 TH/MM3 (ref 150–450)
PMV BLD AUTO: 9.1 FL (ref 7–11)
PROT SERPL-MCNC: 8 GM/DL (ref 6.4–8.2)
RBC # BLD AUTO: 4.86 MIL/MM3 (ref 4–5.3)
SODIUM SERPL-SCNC: 139 MEQ/L (ref 136–145)
SP GR UR STRIP: 1.02 (ref 1–1.03)
SQUAMOUS #/AREA URNS HPF: 3 /HPF (ref 0–5)
URINE LEUKOCYTE ESTERASE: (no result)
WBC # BLD AUTO: 6.5 TH/MM3 (ref 4–11)

## 2018-04-16 PROCEDURE — 99284 EMERGENCY DEPT VISIT MOD MDM: CPT

## 2018-04-16 PROCEDURE — 74177 CT ABD & PELVIS W/CONTRAST: CPT

## 2018-04-16 PROCEDURE — 96374 THER/PROPH/DIAG INJ IV PUSH: CPT

## 2018-04-16 PROCEDURE — 81001 URINALYSIS AUTO W/SCOPE: CPT

## 2018-04-16 PROCEDURE — 96361 HYDRATE IV INFUSION ADD-ON: CPT

## 2018-04-16 PROCEDURE — 96375 TX/PRO/DX INJ NEW DRUG ADDON: CPT

## 2018-04-16 PROCEDURE — 85025 COMPLETE CBC W/AUTO DIFF WBC: CPT

## 2018-04-16 PROCEDURE — 83690 ASSAY OF LIPASE: CPT

## 2018-04-16 PROCEDURE — 80053 COMPREHEN METABOLIC PANEL: CPT

## 2018-04-16 NOTE — PD
Data


Data


Last Documented VS





Vital Signs








  Date Time  Temp Pulse Resp B/P (MAP) Pulse Ox O2 Delivery O2 Flow Rate FiO2


 


4/16/18 12:41  75 18 156/72 (100) 100 Room Air  


 


4/16/18 10:15 98.7       








Orders





 Orders


Complete Blood Count With Diff (4/16/18 11:11)


Comprehensive Metabolic Panel (4/16/18 11:11)


Lipase (4/16/18 11:11)


Urinalysis - C+S If Indicated (4/16/18 11:11)


Ct Abd/Pel W Iv Contrast(Rout) (4/16/18 11:11)


Iv Access Insert/Monitor (4/16/18 11:11)


Morphine Inj (Morphine Inj) (4/16/18 11:15)


Ondansetron Inj (Zofran Inj) (4/16/18 11:15)


Sodium Chlor 0.9% 1000 Ml Inj (Ns 1000 M (4/16/18 11:11)


Iohexol 350 Inj (Omnipaque 350 Inj) (4/16/18 10:06)


Dicyclomine Inj (Bentyl Inj) (4/16/18 14:15)


Ed Discharge Order (4/16/18 14:12)





Labs





Laboratory Tests








Test


  4/16/18


10:42 4/16/18


13:10


 


White Blood Count 6.5 TH/MM3  


 


Red Blood Count 4.86 MIL/MM3  


 


Hemoglobin 13.5 GM/DL  


 


Hematocrit 40.4 %  


 


Mean Corpuscular Volume 83.1 FL  


 


Mean Corpuscular Hemoglobin 27.9 PG  


 


Mean Corpuscular Hemoglobin


Concent 33.5 % 


  


 


 


Red Cell Distribution Width 13.9 %  


 


Platelet Count 339 TH/MM3  


 


Mean Platelet Volume 9.1 FL  


 


Neutrophils (%) (Auto) 56.6 %  


 


Lymphocytes (%) (Auto) 35.1 %  


 


Monocytes (%) (Auto) 5.5 %  


 


Eosinophils (%) (Auto) 1.6 %  


 


Basophils (%) (Auto) 1.2 %  


 


Neutrophils # (Auto) 3.7 TH/MM3  


 


Lymphocytes # (Auto) 2.3 TH/MM3  


 


Monocytes # (Auto) 0.4 TH/MM3  


 


Eosinophils # (Auto) 0.1 TH/MM3  


 


Basophils # (Auto) 0.1 TH/MM3  


 


CBC Comment DIFF FINAL  


 


Differential Comment   


 


Blood Urea Nitrogen 10 MG/DL  


 


Creatinine 0.83 MG/DL  


 


Random Glucose 92 MG/DL  


 


Total Protein 8.0 GM/DL  


 


Albumin 3.8 GM/DL  


 


Calcium Level 9.7 MG/DL  


 


Alkaline Phosphatase 100 U/L  


 


Aspartate Amino Transf


(AST/SGOT) 29 U/L 


  


 


 


Alanine Aminotransferase


(ALT/SGPT) 20 U/L 


  


 


 


Total Bilirubin 0.6 MG/DL  


 


Sodium Level 139 MEQ/L  


 


Potassium Level 5.2 MEQ/L  


 


Chloride Level 105 MEQ/L  


 


Carbon Dioxide Level 28.8 MEQ/L  


 


Anion Gap 5 MEQ/L  


 


Estimat Glomerular Filtration


Rate 70 ML/MIN 


  


 


 


Lipase 63 U/L  


 


Urine Color  YELLOW 


 


Urine Turbidity  CLEAR 


 


Urine pH  6.5 


 


Urine Specific Gravity  1.018 


 


Urine Protein  NEG mg/dL 


 


Urine Glucose (UA)  NEG mg/dL 


 


Urine Ketones  NEG mg/dL 


 


Urine Occult Blood  NEG 


 


Urine Nitrite  NEG 


 


Urine Bilirubin  NEG 


 


Urine Urobilinogen


  


  LESS THAN 2.0


MG/DL


 


Urine Leukocyte Esterase  NEG 


 


Urine WBC


  


  LESS THAN 1


/hpf


 


Urine Squamous Epithelial


Cells 


  3 /hpf 


 


 


Microscopic Urinalysis Comment


  


  CULT NOT


INDICATED











MDM


Supervised Visit with HAKAN:  Yes


Narrative Course


I, Dr. Nixon, have reviewed the advance practice practitioner's documentation 

and am in agreement, met with the patient face to face, made the diagnosis, and 

the medical decision making was done by me.  





*My assessment and Findings: Extensive workup was done including lab studies 

and CT of abdomen and pelvis.  Nothing emergent is found.  She does have 

appointment with her GI physician tomorrow.  Stable for outpatient follow-up.


Diagnosis





 Primary Impression:  


 Abdominal pain


 Qualified Codes:  R10.84 - Generalized abdominal pain


Patient Instructions:  General Instructions, Laxative, Stool Softeners (By mouth

), Abdominal Pain (ED)


Departure Forms:  Tests/Procedures





***Additional Instruction:  


Colace for constipation.  Follow-up with her GI doctor tomorrow as scheduled.  

Return for any emergent medical conditions.


Scripts


Docusate Sodium (Colace) 100 Mg Capsule


100 MG PO BID for Prevent Constipation for 14 Days, #28 CAP 0 Refills


   Prov: Kenji Nixon MD         4/16/18


Disposition:  01 DISCHARGE HOME


Condition:  Stable











Kenji Nixon MD Apr 16, 2018 14:22

## 2018-04-16 NOTE — RADRPT
EXAM DATE/TIME:  04/16/2018 13:09 

 

[facility] COMPARISON:     

CT ABDOMEN & PELVIS W CONTRAST, December 06, 2017, 18:49.

 

 

INDICATIONS :     

Lower abdomen pain with nausea,vomiting and constipation

                      

 

IV CONTRAST:     

92 cc Omnipaque 350 (iohexol) IV 

 

 

ORAL CONTRAST:      

No oral contrast ingested.

                      

 

RADIATION DOSE:     

6.64 CTDIvol (mGy) 

 

 

MEDICAL HISTORY :     

Carcinoma, breast. Carcinoma, colon. Chronic obstructive pulmonary disease.Hypertension,cardiac

 

SURGICAL HISTORY :      

Cholecystectomy. Hysterectomy.

 

ENCOUNTER:      

Initial

 

ACUITY:      

1 week

 

PAIN SCALE:      

10/10

 

LOCATION:         

Abdomen

 

TECHNIQUE:     

Volumetric scanning of the abdomen and pelvis was performed.  Using automated exposure control and ad
justment of the mA and/or kV according to patient size, radiation dose was kept as low as reasonably 
achievable to obtain optimal diagnostic quality images.  DICOM format image data is available electro
nically for review and comparison.  

 

FINDINGS:     

 

LOWER LUNGS:     

The visualized lower lungs are clear.

 

LIVER:     

Again seen is the mild duct dilatation and prominent common duct.  Gallbladder surgically absent.

 

SPLEEN:     

Normal size without lesion.

 

PANCREAS:     

Within normal limits.

 

KIDNEYS:     

Normal in size and shape.  There is no mass, stone or hydronephrosis.

 

ADRENAL GLANDS:     

Within normal limits.

 

VASCULAR:     

There is no aortic aneurysm.

 

BOWEL/MESENTERY:     

Moderate stool throughout the colon.

 

ABDOMINAL WALL:     

Within normal limits.

 

RETROPERITONEUM:     

There is no lymphadenopathy. 

 

BLADDER:     

No wall thickening or mass. 

 

REPRODUCTIVE:     

Within normal limits.

 

INGUINAL:     

There is no lymphadenopathy or hernia. 

 

MUSCULOSKELETAL:     

Moderate degenerative changes in the lumbar spine.  Injection granulomas are noted in the left buttoc
ks.

 

CONCLUSION:     

Prominent and hepatic ducts and common bile duct stable minimal.  Moderate stool throughout the colon
 progressed in the interval.

 

 

 

 Romario Tompkins MD FACR on April 16, 2018 at 13:26           

Board Certified Radiologist.

 This report was verified electronically.

## 2018-04-16 NOTE — PD
HPI


Chief Complaint:  Abdominal Pain


Time Seen by Provider:  11:04


Travel History


International Travel<30 days:  No


Contact w/Intl Traveler<30days:  No


Traveled to known affect area:  No





History of Present Illness


HPI


This is a 57-year-old female who reports a history of colon cancer.  She 

follows with a gastroenterologist in [place] and she has an appointment 

tomorrow.  She presents for evaluation of abdominal pain.  She has some 

underlying chronic abdominal pain but she reports that her current abdominal 

pain started 2 weeks ago.  She describes it as a "tense pain" in her lower 

abdomen which is constant.  She endorses associated nausea, vomiting.  At some 

point in the last month her primary care physician changed her pain regimen 

from Percocet to Lortab.  She is denying dysuria, flank pain, diarrhea or 

constipation.  No other complaints.





PFSH


Past Medical History


Arthritis:  Yes (RA)


Asthma:  Yes


Heart Rhythm Problems:  No


Cancer:  Yes (BREAST, COLON )


Cardiac Catheterization:  Yes


Cardiovascular Problems:  Yes


High Cholesterol:  No


Chemotherapy:  Yes


Chest Pain:  Yes


Congestive Heart Failure:  No


COPD:  Yes


Cerebrovascular Accident:  Yes (TIA)


Diabetes:  No


Diminished Hearing:  No


Endocrine:  No


Gastrointestinal Disorders:  Yes (ACID REFLUX)


Genitourinary:  No


Hepatitis:  No


Hiatal Hernia:  No


Heparin Induced Thrombocytopen:  No


Hypertension:  Yes


Immune Disorder:  No


Implanted Vascular Access Dvce:  Yes ([employer], MODEL [id number])


Musculoskeletal:  Yes (arthritis entire body  degenerative disease)


Neurologic:  No


Psychiatric:  No


Reproductive:  Yes (hx of hysterectomy)


Respiratory:  Yes


Immunizations Current:  No


Thyroid Disease:  No


Tetanus Vaccination:  < 5 Years


Influenza Vaccination:  Yes


Pregnant?:  Not Pregnant


Menopausal:  Yes


:  4


Para:  4


Miscarriage:  0


:  0





Past Surgical History


Abdominal Surgery:  Yes (HERNIA REPAIR, CHOLECYSTECTOMY)


AICD:  Yes


Cardiac Surgery:  Yes (PACEMAKER)


Cholecystectomy:  Yes


Coronary Artery Bypass Graft:  No


Endocrine Surgery:  No


Genitourinary Surgery:  No


Gynecologic Surgery:  Yes (fibroids in breast, HYSERTECTOMY)


Hysterectomy:  Yes


Joint Replacement:  No


Mastectomy:  Yes (RIGHT)


Pacemaker:  Yes ([employer])


Other Surgery:  Yes (abd hernia, right knee surgery)





Family History


Family Myocardial Infarction:  Yes (father, brother)





Social History


Alcohol Use:  No


Tobacco Use:  Yes (1 ppd )


Substance Use:  No





Allergies-Medications


(Allergen,Severity, Reaction):  


Coded Allergies:  


     tramadol (Unverified  Allergy, Severe, Palpitations, 18)


     duloxetine (Unverified  Allergy, Intermediate, Rash, 18)


 Rash all over


     ibuprofen (Unverified  Allergy, Intermediate, Nausea/Vomiting, 18)


     ketorolac (Unverified  Allergy, Intermediate, Rash, 18)


 Rash all over body


     naproxen (Unverified  Allergy, Intermediate, Rash, 18)


 Rash all over body


     pregabalin (Unverified  Allergy, Intermediate, Rash, 18)


 Rash all over body


     promethazine (Unverified  Allergy, Intermediate, Rash, 18)


 Rash all over body


     tizanidine (Unverified  Allergy, Intermediate, Elevated blood sugar , )


Reported Meds & Prescriptions





Reported Meds & Active Scripts


Active


Colace (Docusate Sodium) 100 Mg Capsule 100 Mg PO BID 14 Days


Symbicort Inh (Budesonide/Formoterol Fumarate) 160-4.5 Mcg/Act Aero 2 Puff INH 

ONCE


Pramipexole (Pramipexole Dihydrochloride) 0.125 Mg Tab 0.125 Mg PO DAILY


Percocet (Oxycodone-Acetaminophen) 7.5-325 mg Tab 1 Tab PO Q6H PRN


     Do not fill until 3/19/2017


Percocet (Oxycodone-Acetaminophen) 7.5-325 mg Tab 1 Tab PO Q6H PRN


     Do not fill until 2018


Percocet (Oxycodone-Acetaminophen) 7.5-325 mg Tab 1 Tab PO Q6HR PRN


     Do not fill until 2018.


Bentyl (Dicyclomine HCl) 10 Mg Cap 10 Mg PO TID PRN


Omeprazole 40 Mg Cap 40 Mg PO DAILY


Alendronate (Alendronate Sodium) 70 Mg Tab 70 Mg PO Q7D


Proair Hfa 8.5 GM Inh (Albuterol Sulfate) 90 Mcg/Act Aer 2 Puff INH Q4HR PRN


     108 mcg/actuation


Gabapentin 600 Mg Tab 600 Mg PO TID


Phenergan (Promethazine HCl) 25 Mg Tablet 25 Mg PO Q6H PRN


Bentyl (Dicyclomine HCl) 10 Mg Cap 10 Mg PO TID


Lisinopril 10 Mg Tab 20 Mg PO DAILY


Spiriva Handihaler (Tiotropium Inh) 18 Mcg Cap 18 Mcg INH DAILY


     1 capsule = 18 mcg


Montelukast (Montelukast Sodium) 10 Mg Tab 10 Mg PO HS


Trazodone (Trazodone HCl) 50 Mg Tab 50 Mg PO HS


Miralax Powder (Polyethylene Glycol 3350 Powder) 17 Gm Powd 17 Gm PO DAILY


     Mix and dissolve one measuring cap-ful (17 grams) in water or juice.


Amlodipine (Amlodipine Besylate) 5 Mg Tab 5 Mg PO BID


Albuterol Neb (Albuterol Sulfate) 2.5 Mg/3 Ml Neb 2.5 Mg NEB Q4HR NEB PRN


Flexeril (Cyclobenzaprine HCl) 5 Mg Tab 5 Mg PO TID


Reported


Oxycodone-Acetaminophen 7.5-325 mg Tab 1 Tab PO Q6H PRN


Proair Hfa 8.5 GM Inh (Albuterol Sulfate) 90 Mcg/Act Aer 2 Puff INH Q4HR PRN


     108 mcg/actuation


Pramipexole (Pramipexole Dihydrochloride) 0.125 Mg Tab 0.125 Mg PO DAILY


Omeprazole 40 Mg Cap 40 Mg PO DAILY


Montelukast (Montelukast Sodium) 10 Mg Tab 10 Mg PO HS


Lisinopril 20 Mg Tab 20 Mg PO DAILY


Flexeril (Cyclobenzaprine HCl) 5 Mg Tab 5 Mg PO TID


Amlodipine (Amlodipine Besylate) 5 Mg Tab 5 Mg PO BID


Aspirin 81 Low Dose (Aspirin) 81 Mg Chew 81 Mg CHEW DAILY


Ventolin Hfa 18 GM Inh (Albuterol Sulfate) 90 Mcg/Act Aer 2 Puff INH Q4H PRN


Symbicort Inh (Budesonide/Formoterol Fumarate) 160-4.5 Mcg/Act Aero 2 Puff INH 

Q12HR


Spiriva Handihaler (Tiotropium Inh) 18 Mcg Cap 18 Mcg INH DAILY


     1 capsule = 18 mcg


Hydroxyzine HCl 25 Mg Tab 25 Mg PO TID


Trazodone (Trazodone HCl) 50 Mg Tab 50 Mg PO HS


Nortriptyline (Nortriptyline HCl) 25 Mg Cap 25 Mg PO HS


Gabapentin 300 Mg Cap 600 Mg PO TID








Review of Systems


Except as stated in HPI:  all other systems reviewed are Neg





Physical Exam


Narrative


GENERAL: Well-developed well-nourished female who appears anxious.


SKIN: Warm and dry.


HEAD: Atraumatic. Normocephalic. 


EYES: Pupils equal and round. No scleral icterus. No injection or drainage. 


ENT: No nasal bleeding or discharge.  Mucous membranes pink and moist.


NECK: Trachea midline. No JVD. 


CARDIOVASCULAR: Regular rate and rhythm.  No murmur appreciated.


RESPIRATORY: No accessory muscle use. Clear to auscultation. Breath sounds 

equal bilaterally. 


GASTROINTESTINAL: Abdomen soft, tender to palpation in the lower quadrants 

without guarding.


MUSCULOSKELETAL: No obvious deformities.  No edema.


NEUROLOGICAL: Awake and alert. No obvious cranial nerve deficits.  Motor 

grossly within normal limits. Normal speech.





Data


Data


Last Documented VS





Vital Signs








  Date Time  Temp Pulse Resp B/P (MAP) Pulse Ox O2 Delivery O2 Flow Rate FiO2


 


18 12:41  75 18 156/72 (100) 100 Room Air  


 


18 10:15 98.7       








Orders





 Orders


Complete Blood Count With Diff (18 11:11)


Comprehensive Metabolic Panel (18 11:11)


Lipase (18 11:11)


Urinalysis - C+S If Indicated (18 11:11)


Ct Abd/Pel W Iv Contrast(Rout) (18 11:11)


Iv Access Insert/Monitor (18 11:11)


Morphine Inj (Morphine Inj) (18 11:15)


Ondansetron Inj (Zofran Inj) (18 11:15)


Sodium Chlor 0.9% 1000 Ml Inj (Ns 1000 M (18 11:11)


Iohexol 350 Inj (Omnipaque 350 Inj) (18 10:06)


Dicyclomine Inj (Bentyl Inj) (18 14:15)


Ed Discharge Order (18 14:12)





Labs





Laboratory Tests








Test


  18


10:42 18


13:10


 


White Blood Count 6.5 TH/MM3  


 


Red Blood Count 4.86 MIL/MM3  


 


Hemoglobin 13.5 GM/DL  


 


Hematocrit 40.4 %  


 


Mean Corpuscular Volume 83.1 FL  


 


Mean Corpuscular Hemoglobin 27.9 PG  


 


Mean Corpuscular Hemoglobin


Concent 33.5 % 


  


 


 


Red Cell Distribution Width 13.9 %  


 


Platelet Count 339 TH/MM3  


 


Mean Platelet Volume 9.1 FL  


 


Neutrophils (%) (Auto) 56.6 %  


 


Lymphocytes (%) (Auto) 35.1 %  


 


Monocytes (%) (Auto) 5.5 %  


 


Eosinophils (%) (Auto) 1.6 %  


 


Basophils (%) (Auto) 1.2 %  


 


Neutrophils # (Auto) 3.7 TH/MM3  


 


Lymphocytes # (Auto) 2.3 TH/MM3  


 


Monocytes # (Auto) 0.4 TH/MM3  


 


Eosinophils # (Auto) 0.1 TH/MM3  


 


Basophils # (Auto) 0.1 TH/MM3  


 


CBC Comment DIFF FINAL  


 


Differential Comment   


 


Blood Urea Nitrogen 10 MG/DL  


 


Creatinine 0.83 MG/DL  


 


Random Glucose 92 MG/DL  


 


Total Protein 8.0 GM/DL  


 


Albumin 3.8 GM/DL  


 


Calcium Level 9.7 MG/DL  


 


Alkaline Phosphatase 100 U/L  


 


Aspartate Amino Transf


(AST/SGOT) 29 U/L 


  


 


 


Alanine Aminotransferase


(ALT/SGPT) 20 U/L 


  


 


 


Total Bilirubin 0.6 MG/DL  


 


Sodium Level 139 MEQ/L  


 


Potassium Level 5.2 MEQ/L  


 


Chloride Level 105 MEQ/L  


 


Carbon Dioxide Level 28.8 MEQ/L  


 


Anion Gap 5 MEQ/L  


 


Estimat Glomerular Filtration


Rate 70 ML/MIN 


  


 


 


Lipase 63 U/L  


 


Urine Color  YELLOW 


 


Urine Turbidity  CLEAR 


 


Urine pH  6.5 


 


Urine Specific Gravity  1.018 


 


Urine Protein  NEG mg/dL 


 


Urine Glucose (UA)  NEG mg/dL 


 


Urine Ketones  NEG mg/dL 


 


Urine Occult Blood  NEG 


 


Urine Nitrite  NEG 


 


Urine Bilirubin  NEG 


 


Urine Urobilinogen


  


  LESS THAN 2.0


MG/DL


 


Urine Leukocyte Esterase  NEG 


 


Urine WBC


  


  LESS THAN 1


/hpf


 


Urine Squamous Epithelial


Cells 


  3 /hpf 


 


 


Microscopic Urinalysis Comment


  


  CULT NOT


INDICATED











MDM


Medical Decision Making


Medical Screen Exam Complete:  Yes


Emergency Medical Condition:  Yes


Medical Record Reviewed:  Yes


Differential Diagnosis


Chronic abdominal pain related to malignancy versus obstruction versus 

constipation versus diverticulitis


Narrative Course


 CT the abdomen and pelvis was obtained revealing


CONCLUSION:     


Prominent and hepatic ducts and common bile duct stable minimal.  Moderate 

stool throughout the colon progressed in the interval.


 





I suspect the patient is constipated secondary to her frequent opiate analgesic 

use at home.  Her CBC is unremarkable.  Her CMP reveals a hemolyzed specimen 

with a potassium of 5.2, likely secondary to hemolysis.  Urinalysis is normal.  

The patient was given a dose of Bentyl.  She will be discharged with Colace.  I 

did recommend that she discuss with her GI doctor tomorrow consideration of 

reducing her opiate use.





Diagnosis





 Primary Impression:  


 Abdominal pain





***Additional Instructions:  


Colace for constipation.  Follow-up with her GI doctor tomorrow as scheduled.  

Return for any emergent medical conditions.


***Med/Other Pt SpecificInfo:  Prescription(s) given


Scripts


Docusate Sodium (Colace) 100 Mg Capsule


100 MG PO BID for Prevent Constipation for 14 Days, #28 CAP 0 Refills


   Prov: Kenji Nixon MD         18


Disposition:  01 DISCHARGE HOME


Condition:  Stable











Ethan Green 2018 11:13